# Patient Record
Sex: FEMALE | Race: WHITE | NOT HISPANIC OR LATINO | Employment: FULL TIME | URBAN - METROPOLITAN AREA
[De-identification: names, ages, dates, MRNs, and addresses within clinical notes are randomized per-mention and may not be internally consistent; named-entity substitution may affect disease eponyms.]

---

## 2020-06-09 ENCOUNTER — OFFICE VISIT (OUTPATIENT)
Dept: FAMILY MEDICINE CLINIC | Facility: CLINIC | Age: 30
End: 2020-06-09

## 2020-06-09 VITALS
HEIGHT: 66 IN | HEART RATE: 113 BPM | TEMPERATURE: 97.8 F | BODY MASS INDEX: 47.09 KG/M2 | OXYGEN SATURATION: 99 % | SYSTOLIC BLOOD PRESSURE: 130 MMHG | WEIGHT: 293 LBS | DIASTOLIC BLOOD PRESSURE: 82 MMHG

## 2020-06-09 DIAGNOSIS — M65.311 TRIGGER FINGER OF RIGHT THUMB: ICD-10-CM

## 2020-06-09 DIAGNOSIS — E78.2 MIXED HYPERLIPIDEMIA: Primary | ICD-10-CM

## 2020-06-09 DIAGNOSIS — J30.9 ALLERGIC RHINITIS, UNSPECIFIED SEASONALITY, UNSPECIFIED TRIGGER: ICD-10-CM

## 2020-06-09 DIAGNOSIS — E10.9 TYPE 1 DIABETES MELLITUS WITHOUT COMPLICATION (HCC): ICD-10-CM

## 2020-06-09 PROCEDURE — 99203 OFFICE O/P NEW LOW 30 MIN: CPT | Performed by: PHYSICIAN ASSISTANT

## 2020-06-09 RX ORDER — NORETHINDRONE ACETATE AND ETHINYL ESTRADIOL AND FERROUS FUMARATE 1MG-20(24)
KIT ORAL
COMMUNITY
End: 2020-07-22

## 2020-06-09 RX ORDER — PROCHLORPERAZINE 25 MG/1
SUPPOSITORY RECTAL
COMMUNITY
End: 2021-02-02 | Stop reason: SDUPTHER

## 2020-06-09 RX ORDER — THIAMINE HCL 100 MG
TABLET ORAL
COMMUNITY

## 2020-06-09 RX ORDER — ATORVASTATIN CALCIUM 10 MG/1
TABLET, FILM COATED ORAL
COMMUNITY
Start: 2018-12-11 | End: 2020-06-09 | Stop reason: SDUPTHER

## 2020-06-09 RX ORDER — LISINOPRIL 5 MG/1
TABLET ORAL
COMMUNITY
Start: 2018-08-19 | End: 2020-06-09 | Stop reason: SDUPTHER

## 2020-06-09 RX ORDER — MONTELUKAST SODIUM 10 MG/1
10 TABLET ORAL DAILY
COMMUNITY
End: 2020-06-09 | Stop reason: SDUPTHER

## 2020-06-09 RX ORDER — MULTIVIT WITH MINERALS/LUTEIN
TABLET ORAL
COMMUNITY

## 2020-06-09 RX ORDER — MONTELUKAST SODIUM 10 MG/1
10 TABLET ORAL DAILY
Qty: 90 TABLET | Refills: 3 | Status: SHIPPED | OUTPATIENT
Start: 2020-06-09 | End: 2020-12-15

## 2020-06-09 RX ORDER — ONDANSETRON HYDROCHLORIDE 8 MG/1
TABLET, FILM COATED ORAL
COMMUNITY
End: 2020-07-22

## 2020-06-09 RX ORDER — HYDROXYZINE 50 MG/1
TABLET, FILM COATED ORAL
COMMUNITY

## 2020-06-09 RX ORDER — FLUTICASONE PROPIONATE 50 MCG
2 SPRAY, SUSPENSION (ML) NASAL DAILY
Qty: 1 BOTTLE | Refills: 11 | Status: SHIPPED | OUTPATIENT
Start: 2020-06-09 | End: 2020-07-22

## 2020-06-09 RX ORDER — TRAZODONE HYDROCHLORIDE 50 MG/1
TABLET ORAL
COMMUNITY

## 2020-06-09 RX ORDER — LISINOPRIL 5 MG/1
5 TABLET ORAL DAILY
Qty: 90 TABLET | Refills: 3 | Status: SHIPPED | OUTPATIENT
Start: 2020-06-09 | End: 2020-12-15

## 2020-06-09 RX ORDER — FLUTICASONE PROPIONATE 50 MCG
2 SPRAY, SUSPENSION (ML) NASAL DAILY
COMMUNITY
Start: 2017-08-14 | End: 2020-06-09 | Stop reason: SDUPTHER

## 2020-06-09 RX ORDER — MULTIVIT-MIN/IRON/FOLIC ACID/K 18-600-40
CAPSULE ORAL
COMMUNITY

## 2020-06-09 RX ORDER — ATORVASTATIN CALCIUM 10 MG/1
10 TABLET, FILM COATED ORAL NIGHTLY
Qty: 90 TABLET | Refills: 3 | Status: SHIPPED | OUTPATIENT
Start: 2020-06-09 | End: 2021-05-18 | Stop reason: SDUPTHER

## 2020-06-09 RX ORDER — LAMOTRIGINE 100 MG/1
TABLET ORAL DAILY
COMMUNITY

## 2020-06-09 RX ORDER — METFORMIN HYDROCHLORIDE 500 MG/1
TABLET, EXTENDED RELEASE ORAL
COMMUNITY
End: 2021-01-06

## 2020-06-09 NOTE — PROGRESS NOTES
Subjective   Janey Sanchez is a 29 y.o. female  Establish Care (establish care, previous pcp Dr. Paz ) and Med Refill (atorvastin, lisinopril, singulair refills )      History of Present Illness  Patient is a pleasant 29-year-old white female who presents today as a new patient to our practice to establish care.  She sees Dr. Sukhjinder Cabrera for type 1 diabetes mellitus management and was referred to our office through him.  She has really moved to [place] from [facility] with her family.  She has stable bipolar 1 disorder and is managed by her psychiatrist for this condition.  She is having more problems with a trigger thumb on the right hand.  She like to see a hand specialist for this.  She is due for refills of Flonase and Singulair for allergic rhinitis which is currently stable on lisinopril for renal protection due to diabetes mellitus and atorvastatin for dyslipidemia she has been stable on these medications for several years.  The following portions of the patient's history were reviewed and updated as appropriate: allergies, current medications, past social history and problem list    Review of Systems   Constitutional: Negative for chills, fatigue and fever.   HENT: Negative for congestion, ear pain, hearing loss, postnasal drip, rhinorrhea, sinus pressure, sneezing, sore throat and trouble swallowing.         Allergic rhinitis stable on medication   Eyes: Negative for itching.   Respiratory: Negative.  Negative for cough.    Cardiovascular: Negative.  Negative for chest pain.   Genitourinary: Negative.    Musculoskeletal: Positive for arthralgias ( Locking, clicking and pain in right thumb).   Neurological: Negative for headaches.   Psychiatric/Behavioral: Negative.         Anxiety and depression/bipolar disorder stable on medication       Objective     Vitals:    06/09/20 0947   BP: 130/82   Pulse: 113   Temp: 97.8 °F (36.6 °C)   SpO2: 99%     BMI 48.9  Physical Exam   Constitutional: She  is oriented to person, place, and time. She appears well-developed and well-nourished. She does not have a sickly appearance. She does not appear ill. No distress.   Obesity noted     HENT:   Head: Normocephalic and atraumatic.   Neck: No thyromegaly present.   Cardiovascular: Normal rate and regular rhythm.   Pulmonary/Chest: Effort normal. No respiratory distress.   Musculoskeletal: She exhibits tenderness.   Clicking noted right thumb consistent with trigger thumb   Neurological: She is alert and oriented to person, place, and time.   Skin: Skin is warm and dry. She is not diaphoretic.   Psychiatric: She has a normal mood and affect. Her behavior is normal. Judgment and thought content normal.   Nursing note and vitals reviewed.      Assessment/Plan     Janey was seen today for establish care and med refill.    Diagnoses and all orders for this visit:    Mixed hyperlipidemia    Trigger finger of right thumb  -     Ambulatory Referral to Orthopedic Surgery    Type 1 diabetes mellitus without complication (CMS/HCC)    Allergic rhinitis, unspecified seasonality, unspecified trigger    Other orders  -     atorvastatin (LIPITOR) 10 MG tablet; Take 1 tablet by mouth Every Night. TAKE ONE TABLET BY MOUTH DAILY  -     lisinopril (PRINIVIL,ZESTRIL) 5 MG tablet; Take 1 tablet by mouth Daily. TAKE ONE TABLET BY MOUTH DAILY  -     montelukast (SINGULAIR) 10 MG tablet; Take 1 tablet by mouth Daily.  -     fluticasone (FLONASE) 50 MCG/ACT nasal spray; 2 sprays into the nostril(s) as directed by provider Daily. 2 sprays into the nostril(s) as directed by provider Daily.       Answers for HPI/ROS submitted by the patient on 6/8/2020   What is the primary reason for your visit?: Other  Please describe your symptoms.: High BP, high cholesterol, allergies  Have you had these symptoms before?: Yes  How long have you been having these symptoms?: Greater than 2 weeks  Please list any medications you are currently taking for this  condition.: Lisinopril, Atoravastin  Please describe any probable cause for these symptoms. : NA

## 2020-07-16 ENCOUNTER — OFFICE VISIT (OUTPATIENT)
Dept: ORTHOPEDIC SURGERY | Facility: CLINIC | Age: 30
End: 2020-07-16

## 2020-07-16 VITALS — HEIGHT: 66 IN | BODY MASS INDEX: 47.09 KG/M2 | WEIGHT: 293 LBS | HEART RATE: 119 BPM | OXYGEN SATURATION: 98 %

## 2020-07-16 DIAGNOSIS — E66.01 OBESITY, MORBID, BMI 40.0-49.9 (HCC): ICD-10-CM

## 2020-07-16 DIAGNOSIS — M65.312 TRIGGER FINGER OF LEFT THUMB: ICD-10-CM

## 2020-07-16 DIAGNOSIS — M65.311 TRIGGER FINGER OF RIGHT THUMB: ICD-10-CM

## 2020-07-16 DIAGNOSIS — M79.645 BILATERAL THUMB PAIN: Primary | ICD-10-CM

## 2020-07-16 DIAGNOSIS — M79.644 BILATERAL THUMB PAIN: Primary | ICD-10-CM

## 2020-07-16 PROCEDURE — 99214 OFFICE O/P EST MOD 30 MIN: CPT | Performed by: PHYSICIAN ASSISTANT

## 2020-07-16 RX ORDER — LURASIDONE HYDROCHLORIDE 20 MG/1
TABLET, FILM COATED ORAL
COMMUNITY
Start: 2020-07-15 | End: 2020-12-15 | Stop reason: ALTCHOICE

## 2020-07-16 NOTE — PROGRESS NOTES
[facility]    Subjective     Chief Complaint   Patient presents with   • Right Thumb - Pain   • Left Thumb - Pain        ADIA Sanchez is a 30 y.o. female.  Right hand dominant.  Patient presents with right > left thumb pain/triggering.  Symptoms have been ongoing for over year and worsening.  In August 2019, prior to moving here, she had a steroid injection to the right thumb which resolved her symptoms temporarily but caused her blood sugar to increase to over 600.    Currently she endorses a pain scale of 5/10.  Severity of pain moderate.  Quality of pain aching, stabbing, shooting.  Positive nocturnal symptoms/locking.    Patient has DM type 1.  Last A1c in Epic 7.2 from 8/2018 --- she reports number and better.  She report history diabetic neuropathy to both hands.  Also history right cubital and carpal tunnel releases in 2019 (Dr. Rosalino Sims, [facility]).    She denies fever, chills, night sweats or other constitutional symptoms.    Past Medical History:   Diagnosis Date   • Bipolar 1 disorder (CMS/[facility])    • Diabetes mellitus (CMS/[facility])       Past Surgical History:   Procedure Laterality Date   • CARPAL TUNNEL RELEASE     • TONSILLECTOMY        Family History   Problem Relation Age of Onset   • Arthritis Father    • Ovarian cancer Maternal Aunt    • Diabetes Maternal Grandfather    • Hyperlipidemia Maternal Grandfather    • Hypertension Maternal Grandfather    • Arthritis Paternal Grandfather    • Breast cancer Paternal Grandfather      Social History     Socioeconomic History   • Marital status:      Spouse name: Not on file   • Number of children: Not on file   • Years of education: Not on file   • Highest education level: Not on file   Tobacco Use   • Smoking status: Never Smoker   • Smokeless tobacco: Never Used   Substance and Sexual Activity   • Alcohol use: Yes     Comment: 1-2 drinks weekly   • Drug use: Never   • Sexual activity: Defer      Current  Outpatient Medications on File Prior to Visit   Medication Sig Dispense Refill   • Insulin Glargine (TOUJEO SOLOSTAR SC)      • Lurasidone HCl (Latuda) 20 MG tablet tablet      • ascorbic acid (VITAMIN C) 1000 MG tablet      • atorvastatin (LIPITOR) 10 MG tablet Take 1 tablet by mouth Every Night. TAKE ONE TABLET BY MOUTH DAILY 90 tablet 3   • Cariprazine HCl (VRAYLAR) 3 MG capsule capsule Take 3 mg by mouth.     • Cholecalciferol (VITAMIN D) 50 MCG (2000 UT) capsule      • Continuous Blood Gluc Sensor (DEXCOM G6 SENSOR)      • Continuous Blood Gluc Transmit (DEXCOM G6 TRANSMITTER) misc      • DULoxetine HCl (DRIZALMA) 20 MG capsule      • fluticasone (FLONASE) 50 MCG/ACT nasal spray 2 sprays into the nostril(s) as directed by provider Daily. 2 sprays into the nostril(s) as directed by provider Daily. 1 bottle 11   • hydrOXYzine (ATARAX) 50 MG tablet      • insulin degludec (TRESIBA FLEXTOUCH) 100 UNIT/ML solution pen-injector injection Inject  under the skin into the appropriate area as directed. Inject  under the skin into the appropriate area as directed.     • Insulin Lispro, 0.5 Unit Dial, (HumaLOG [place] KwikPen) 100 UNIT/ML solution pen-injector 15-25 units up to 3 times daily as needed     • lamoTRIgine (LaMICtal) 100 MG tablet Take  by mouth Daily.     • lisinopril (PRINIVIL,ZESTRIL) 5 MG tablet Take 1 tablet by mouth Daily. TAKE ONE TABLET BY MOUTH DAILY 90 tablet 3   • Magnesium 500 MG tablet      • metFORMIN ER (GLUCOPHAGE-XR) 500 MG 24 hr tablet 2000 mg daily     • montelukast (SINGULAIR) 10 MG tablet Take 1 tablet by mouth Daily. 90 tablet 3   • Norethin Ace-Eth Estrad-FE (NORETHINDRONE ACET-ETHINYL EST) 1-20 MG-MCG(24) chewable tablet      • ondansetron (ZOFRAN) 8 MG tablet As needed     • Semaglutide,0.25 or 0.5MG/DOS, (Ozempic, 0.25 or 0.5 MG/DOSE,) 2 MG/1.5ML solution pen-injector      • traZODone (DESYREL) 25 MG half tablet        No current facility-administered medications on file prior to  "visit.       Allergies   Allergen Reactions   • Clindamycin/Lincomycin Unknown - High Severity        The following portions of the patient's history were reviewed and updated as appropriate: allergies, current medications, past family history, past medical history, past social history, past surgical history and problem list.    Review of Systems   Constitutional: Negative.    HENT: Negative.    Eyes: Negative.    Respiratory: Negative.    Cardiovascular: Negative.    Gastrointestinal: Negative.    Endocrine: Negative.    Genitourinary: Negative.    Musculoskeletal: Positive for arthralgias.   Skin: Negative.    Allergic/Immunologic: Negative.    Neurological: Negative.    Hematological: Negative.    Psychiatric/Behavioral: Negative.         Objective      Physical Exam  Pulse 119   Ht 167.6 cm (65.98\")   Wt (!) 137 kg (301 lb 6.4 oz)   SpO2 98%   BMI 48.67 kg/m²     Body mass index is 48.67 kg/m².    GENERAL APPEARANCE: awake, alert & oriented x 3, in no acute distress and well developed, well nourished  PSYCH: normal mood and affect  LUNGS:  breathing nonlabored, no wheezing  EYES: sclera anicteric, pupils equal  CARDIOVASCULAR: palpable radial pulses bilaterally. Capillary refill less than 2 seconds  INTEGUMENTARY: skin intact, no clubbing, cyanosis  NEUROLOGIC:  Normal Sensation        Ortho Exam  Peripheral Vascular   Bilateral Upper Extremity    No cyanotic nail beds    Pink nail beds and rapid capillary refill   Palpation    Radial Pulse - Bilaterally normal    Neurologic   Sensory: Light touch intact- Right and left hand    Left Upper Extremity    Left wrist extensors: 5/5    Left wrist flexors: 5/5    Left intrinsics: 5/5   Right Upper Extremity    Right wrist extensors: 5/5    Right wrist flexors: 5/5    Right intrinsics: 5/5    Musculoskeletal     Inspection and Palpation   Right /Left Wrist      Tenderness -none    Swelling - none    Crepitus - none    Muscle tone - no atrophy     Functional Testing: "     Right Wrist and Thumb      Finklestein's:  Negative     CMC shuck:  Negative    CMC grind:  Negative    CMC tenderness: Negative    A1 pulley:  Positive     Left Wrist and Thumb       Finklestein's:  Negative    CMC shuck:  Negative    CMC grind:  Negative    CMC tenderness: Negative    A1 pulley:  Mild discomfort       Strength and Tone    Right  strength: good    Left  strength: good     Hand Exam:  Able to make bilateral composite fists.      Imaging/Studies  Ordered bilateral thumbs plain films.  Imaging read by Dr. Welsh.    Imaging Results (Last 7 Days)     Procedure Component Value Units Date/Time    XR Finger 2+ View Right [[id number]] Resulted:  07/16/20 1432     Updated:  07/16/20 1432    Narrative:       Indication: Right thumb pain    Comparison: No prior xrays are available for comparison      Impression:            Right thumb 2 views: Radiographs demonstrate no acute bony injury or   fracture.  Joint spaces are well preserved.  No evidence of osseous   lesions.    XR Finger 2+ View Left [[id number]] Resulted:  07/16/20 1420     Updated:  07/16/20 1432    Narrative:       Indication: Left thumb pain    Comparison: No prior xrays are available for comparison      Impression:            Left thumb 2 views: Radiographs demonstrate no acute bony injury or   fracture.  Joint spaces are well preserved.  No evidence of osseous   lesions.          Assessment/Plan        ICD-10-CM ICD-9-CM   1. Bilateral thumb pain M79.644 729.5    M79.645    2. Trigger finger of right thumb M65.311 727.03   3. Trigger finger of left thumb M65.312 727.03   4. Obesity, morbid, BMI 40.0-49.9 (CMS/[facility]) E66.01 278.01       Orders Placed This Encounter   Procedures   • XR Finger 2+ View Left   • XR Finger 2+ View Right        -Bilateral thumb pain (right >> left) due to A1 pulley trigger thumbs.  -Due to patient's significant elevation of blood sugar following prior steroid injection, she is not a candidate for repeat  injection.  -After discussing risks, benefits, indications, she would like to proceed with right thumb A1 pulley release.  -She was introduced to Dr. Angel.  -Surgery to be scheduled at his next available outpatient surgery date.  -Morbid obesity--BMI of 48.7.  Weight loss has been discussed with patient by her PCP.  She has already lost 5 pounds since her PCP visit.  Continue with current weight loss, portion management and exercise routine.  -Questions and concerns were answered.      Indications, risks, benefits and alternatives of surgical versus continued nonsurgical treatment were discussed with the patient. Surgical risks include but are not limited to pain, bleeding, infection, failure to relieve symptoms, need for further procedures, recurrence of symptoms, damage to healthy adjacent structures, stiffness, weakness, scar, DVT/PE, loss of limb or life. We also discussed the postoperative protocol and expected outcome. All questions were answered; the patient would like to proceed with surgical intervention.     Patient was also examined by Dr. Angel and he agrees with the above assessment and plan.      Medical Decision Making  Management Options : major surgery with risk factors  Data/Risk: radiology tests       Justine Varlea PA-C  07/16/20  20:27         EMR Dragon/Transcription disclaimer:  Much of this encounter note is an electronic transcription of spoken language to printed text. Electronic transcription of spoken language may permit erroneous, or at times, nonsensical words or phrases to be inadvertently transcribed. Although I have reviewed the note for such errors, some may still exist.      Answers for HPI/ROS submitted by the patient on 7/9/2020   What is the primary reason for your visit?: Other  Please describe your symptoms.: Trigger thumbs  Have you had these symptoms before?: Yes  How long have you been having these symptoms?: Greater than 2 weeks  Please list any  medications you are currently taking for this condition.: None  Please describe any probable cause for these symptoms. : Diabetes, carpal tunnel

## 2020-07-17 NOTE — PROGRESS NOTES
I have reviewed the notes, assessments, and/or procedures performed by Justine Varela PA-C, I concur with her documentation of Janey Sanchez.

## 2020-07-22 ENCOUNTER — TRANSCRIBE ORDERS (OUTPATIENT)
Dept: LAB | Facility: HOSPITAL | Age: 30
End: 2020-07-22

## 2020-07-22 ENCOUNTER — LAB (OUTPATIENT)
Dept: LAB | Facility: HOSPITAL | Age: 30
End: 2020-07-22

## 2020-07-22 DIAGNOSIS — E10.9 TYPE 1 DIABETES MELLITUS WITHOUT COMPLICATION (HCC): ICD-10-CM

## 2020-07-22 DIAGNOSIS — E10.9 TYPE 1 DIABETES MELLITUS WITHOUT COMPLICATION (HCC): Primary | ICD-10-CM

## 2020-07-22 LAB
CHOLEST SERPL-MCNC: 155 MG/DL (ref 0–200)
FSH SERPL-ACNC: 14.3 MIU/ML
HBA1C MFR BLD: 6.6 % (ref 4.8–5.6)
HDLC SERPL-MCNC: 44 MG/DL (ref 40–60)
LDLC SERPL CALC-MCNC: 85 MG/DL (ref 0–100)
LDLC/HDLC SERPL: 1.94 {RATIO}
LH SERPL-ACNC: 34.6 MIU/ML
PROGEST SERPL-MCNC: 0.83 NG/ML
T4 FREE SERPL-MCNC: 1.37 NG/DL (ref 0.93–1.7)
TRIGL SERPL-MCNC: 129 MG/DL (ref 0–150)
TSH SERPL DL<=0.05 MIU/L-ACNC: 1.71 UIU/ML (ref 0.27–4.2)
VLDLC SERPL-MCNC: 25.8 MG/DL (ref 5–40)

## 2020-07-22 PROCEDURE — 84144 ASSAY OF PROGESTERONE: CPT

## 2020-07-22 PROCEDURE — 84439 ASSAY OF FREE THYROXINE: CPT

## 2020-07-22 PROCEDURE — 84443 ASSAY THYROID STIM HORMONE: CPT

## 2020-07-22 PROCEDURE — 83036 HEMOGLOBIN GLYCOSYLATED A1C: CPT

## 2020-07-22 PROCEDURE — 80061 LIPID PANEL: CPT

## 2020-07-22 PROCEDURE — 83002 ASSAY OF GONADOTROPIN (LH): CPT

## 2020-07-22 PROCEDURE — 83001 ASSAY OF GONADOTROPIN (FSH): CPT

## 2020-07-22 PROCEDURE — 36415 COLL VENOUS BLD VENIPUNCTURE: CPT

## 2020-07-22 NOTE — TELEPHONE ENCOUNTER
----- Message from Janey Sanchez sent at 7/22/2020  8:47 AM EDT -----  Regarding: Referral Request  Contact: [phone]  I was wondering if you could refer me to a dermatologist? I have a small cyst on my back. I didn't know if it was something you would want to see first or if you could just send in a referral.     Thanks again.     Janey

## 2020-07-22 NOTE — TELEPHONE ENCOUNTER
----- Message from Janey Sanchez sent at 7/22/2020  8:42 AM EDT -----  Regarding: Prescription Question  Contact: [phone]  Hi! I was wondering if you would call in my DULoxetine HCL DR 20mg capsule?    My old family doctor did when we lived in [place]. But I just ran out last night and didn't realize that I needed a refill.     [facility].     Thank you,    Janey

## 2020-07-22 NOTE — TELEPHONE ENCOUNTER
Please make sure patient has quantity sufficient of her duloxetine until her appointment on the 28th and then further refills can be given at the appointment

## 2020-07-24 ENCOUNTER — TELEPHONE (OUTPATIENT)
Dept: FAMILY MEDICINE CLINIC | Facility: CLINIC | Age: 30
End: 2020-07-24

## 2020-07-27 ENCOUNTER — TRANSCRIBE ORDERS (OUTPATIENT)
Dept: CARDIOLOGY | Facility: OTHER | Age: 30
End: 2020-07-27

## 2020-07-27 ENCOUNTER — OFFICE VISIT (OUTPATIENT)
Dept: FAMILY MEDICINE CLINIC | Facility: CLINIC | Age: 30
End: 2020-07-27

## 2020-07-27 VITALS
HEIGHT: 66 IN | OXYGEN SATURATION: 99 % | BODY MASS INDEX: 47.09 KG/M2 | DIASTOLIC BLOOD PRESSURE: 70 MMHG | WEIGHT: 293 LBS | HEART RATE: 82 BPM | SYSTOLIC BLOOD PRESSURE: 124 MMHG | TEMPERATURE: 97.8 F

## 2020-07-27 DIAGNOSIS — E10.42 DIABETIC POLYNEUROPATHY ASSOCIATED WITH TYPE 1 DIABETES MELLITUS (HCC): ICD-10-CM

## 2020-07-27 DIAGNOSIS — L98.9 SKIN LESION OF BACK: Primary | ICD-10-CM

## 2020-07-27 DIAGNOSIS — L72.9 SKIN CYST: Primary | ICD-10-CM

## 2020-07-27 PROCEDURE — 99214 OFFICE O/P EST MOD 30 MIN: CPT | Performed by: PHYSICIAN ASSISTANT

## 2020-07-28 NOTE — PROGRESS NOTES
Subjective   Janey Sanchez is a 30 y.o. female  Cyst (possible cyst on back, getting bigger, painful x6 months ) and Hypertension (follow up on BP, discuss possible changes in meds )      History of Present Illness  Patient is a pleasant 40-year-old white female who presents today to discuss several medical concerns.  She is a type I diabetic followed by endocrinology.  She is on lisinopril for renal protection.  She and her  are considering the upcoming possibility of trying to have a child.  She states that when she discussed this with her OB/GYN they mention that she needed to switch to a different antihypertensive medication while pregnant.  Patient does not have history of hypertension she is on lisinopril for renal protection.  Additionally she states she has an area on her back which is a skin lesion she has been told was a cyst and it is growing larger and causing discomfort.  This is been growing for the past 6 to 9 months.  Last issue is of diabetic polyneuropathy which has been well controlled on Cymbalta 20 mg daily and she would like a refill of this medication.  She denies ever spectrums medication and her neuropathy is well controlled with it.  The following portions of the patient's history were reviewed and updated as appropriate: allergies, current medications, past social history and problem list    Review of Systems   Constitutional: Negative for fatigue and unexpected weight change.   Respiratory: Negative for cough, chest tightness and shortness of breath.    Cardiovascular: Negative for chest pain, palpitations and leg swelling.   Gastrointestinal: Negative for nausea.   Skin: Positive for color change and wound. Negative for rash.   Neurological: Positive for numbness. Negative for dizziness, syncope, weakness and headaches.       Objective     Vitals:    07/27/20 1503   BP: 124/70   Pulse: 82   Temp: 97.8 °F (36.6 °C)   SpO2: 99%       Physical Exam   Constitutional: She is oriented  to person, place, and time. She appears well-developed and well-nourished. No distress.   Obesity noted     Neck: No JVD present. No thyromegaly present.   Cardiovascular: Normal rate, regular rhythm, normal heart sounds and intact distal pulses.   No murmur heard.  Pulmonary/Chest: Effort normal and breath sounds normal. No respiratory distress. She exhibits no tenderness.   Abdominal: Soft. She exhibits no distension. There is no tenderness.   Musculoskeletal: She exhibits no edema.   Neurological: She is alert and oriented to person, place, and time.   Skin: Skin is warm and dry. She is not diaphoretic. There is erythema. No pallor.   Raised, scaly, nodular skin lesion right upper back, appears inflamed.  No drainage.   Psychiatric: She has a normal mood and affect. Her behavior is normal. Judgment and thought content normal.   Nursing note and vitals reviewed.      Assessment/Plan     Janey was seen today for cyst and hypertension.    Diagnoses and all orders for this visit:    Skin lesion of back  -     Ambulatory Referral to Dermatology    Diabetic polyneuropathy associated with type 1 diabetes mellitus (CMS/[facility])    Other orders  -     DULoxetine HCl (DRIZALMA) 20 MG capsule; Take 1 capsule by mouth Daily.    I have advised patient to discuss her lisinopril with her endocrinologist and she has not come to point with a high risk OB to also discuss this with the OB regarding fact she is not taking is for hypertension and therefore do not feel that she will need to be on antihypertensive when lisinopril is held during pregnancy.  Answers for HPI/ROS submitted by the patient on 7/27/2020   What is the primary reason for your visit?: Other  Please describe your symptoms.: I have a cyst on my back that has been bothering me. I also am talking with my endo and OBGYN about having a baby, the OBGYN mentioned changing my blood pressure medication.  Have you had these symptoms before?: Yes  How long have you been having  these symptoms?: Greater than 2 weeks

## 2020-08-11 ENCOUNTER — APPOINTMENT (OUTPATIENT)
Dept: PREADMISSION TESTING | Facility: HOSPITAL | Age: 30
End: 2020-08-11

## 2020-08-13 ENCOUNTER — OFFICE VISIT (OUTPATIENT)
Dept: FAMILY MEDICINE CLINIC | Facility: CLINIC | Age: 30
End: 2020-08-13

## 2020-08-13 VITALS
BODY MASS INDEX: 47.09 KG/M2 | DIASTOLIC BLOOD PRESSURE: 82 MMHG | SYSTOLIC BLOOD PRESSURE: 128 MMHG | WEIGHT: 293 LBS | TEMPERATURE: 98.6 F | OXYGEN SATURATION: 99 % | HEART RATE: 92 BPM | HEIGHT: 66 IN

## 2020-08-13 DIAGNOSIS — S46.911A MUSCLE STRAIN OF RIGHT SCAPULAR REGION, INITIAL ENCOUNTER: Primary | ICD-10-CM

## 2020-08-13 PROCEDURE — 99213 OFFICE O/P EST LOW 20 MIN: CPT | Performed by: PHYSICIAN ASSISTANT

## 2020-08-13 RX ORDER — TIZANIDINE 2 MG/1
2 TABLET ORAL EVERY 8 HOURS PRN
Qty: 30 TABLET | Refills: 0 | Status: SHIPPED | OUTPATIENT
Start: 2020-08-13 | End: 2020-12-15

## 2020-11-05 ENCOUNTER — TELEPHONE (OUTPATIENT)
Dept: FAMILY MEDICINE CLINIC | Facility: CLINIC | Age: 30
End: 2020-11-05

## 2020-11-05 NOTE — TELEPHONE ENCOUNTER
----- Message from Janey Sanchez sent at 11/4/2020  1:42 PM EST -----  Regarding: Prescription Question  Contact: [phone]  Hi. My neuropathy pain has been worsening. Especially in my hands and feet. Is there another medicine that might help? I'm currently on 20mg of Celexa but it doesn't do much.    Thank you.

## 2020-11-05 NOTE — TELEPHONE ENCOUNTER
Please have patient schedule appointment to come into the office for me to examine her and determine a better treatment plan.

## 2020-11-20 ENCOUNTER — OFFICE VISIT (OUTPATIENT)
Dept: ENDOCRINOLOGY | Facility: CLINIC | Age: 30
End: 2020-11-20

## 2020-11-20 VITALS
OXYGEN SATURATION: 97 % | TEMPERATURE: 97.8 F | DIASTOLIC BLOOD PRESSURE: 80 MMHG | HEART RATE: 116 BPM | SYSTOLIC BLOOD PRESSURE: 124 MMHG | BODY MASS INDEX: 47.09 KG/M2 | HEIGHT: 66 IN | WEIGHT: 293 LBS

## 2020-11-20 DIAGNOSIS — I10 BENIGN HYPERTENSION: ICD-10-CM

## 2020-11-20 DIAGNOSIS — E10.65 UNCONTROLLED TYPE 1 DIABETES MELLITUS WITH HYPERGLYCEMIA (HCC): Primary | ICD-10-CM

## 2020-11-20 PROBLEM — E78.00 PURE HYPERCHOLESTEROLEMIA: Status: ACTIVE | Noted: 2020-11-20

## 2020-11-20 LAB
EXPIRATION DATE: NORMAL
HBA1C MFR BLD: 6.3 %
Lab: NORMAL

## 2020-11-20 PROCEDURE — 90471 IMMUNIZATION ADMIN: CPT | Performed by: INTERNAL MEDICINE

## 2020-11-20 PROCEDURE — 83036 HEMOGLOBIN GLYCOSYLATED A1C: CPT | Performed by: INTERNAL MEDICINE

## 2020-11-20 PROCEDURE — 99213 OFFICE O/P EST LOW 20 MIN: CPT | Performed by: INTERNAL MEDICINE

## 2020-11-20 PROCEDURE — 90686 IIV4 VACC NO PRSV 0.5 ML IM: CPT | Performed by: INTERNAL MEDICINE

## 2020-11-20 RX ORDER — SEMAGLUTIDE 1.34 MG/ML
1 INJECTION, SOLUTION SUBCUTANEOUS WEEKLY
Qty: 9 ML | Refills: 3 | Status: SHIPPED | OUTPATIENT
Start: 2020-11-20 | End: 2021-02-02 | Stop reason: SDUPTHER

## 2020-11-20 NOTE — PROGRESS NOTES
"     Office Note      Date: 2020  Patient Name: Janey Sanchez  MRN: [id number]  : 1990    Chief Complaint   Patient presents with   • Diabetes       History of Present Illness:   Janey Sanchez is a 30 y.o. female who presents for Diabetes type 1 but with insulin resistance. Diagnosed in: . Treated in past with insulin. Current treatments: basal bolus insulin, metformin, and ozempic. Number of insulin shots per day: 4. Checks blood sugar none - on DexCom G6 CGM. Has low blood sugar: occasional. Aspirin use: No - no indication. Statin use: Yes. ACE-I/ARB use: Yes. Changes in health since last visit: none. Last eye exam 2019.    She has received notification of approval of OmniPod.  She plans to get trained on this soon.    They have put pregnancy plans on hold for now.    Subjective      Diabetic Complications:  Eyes: No  Kidneys: No  Feet: peripheral neuropathy  Heart: No    Diet and Exercise:  Meals per day: 3  Minutes of exercise per week: 0 mins.    Review of Systems:   Review of Systems   Constitutional: Negative.    Cardiovascular: Negative.    Gastrointestinal: Negative.    Endocrine: Negative.        The following portions of the patient's history were reviewed and updated as appropriate: allergies, current medications, past family history, past medical history, past social history, past surgical history and problem list.    Objective       Visit Vitals  /80 (BP Location: Left arm, Patient Position: Sitting, Cuff Size: Adult)   Pulse 116   Temp 97.8 °F (36.6 °C) (Infrared)   Ht 167.6 cm (66\")   Wt (!) 139 kg (305 lb 6.4 oz)   SpO2 97%   BMI 49.29 kg/m²       Physical Exam:  Physical Exam  Constitutional:       Appearance: Normal appearance.   Neurological:      Mental Status: She is alert.         Labs:    HbA1c  Lab Results   Component Value Date    HGBA1C 6.3 2020       CMP  Lab Results   Component Value Date    BUN 8 2018    CREATININE 0.67 2018    EGFRIFNONA " >60 08/28/2018    EGFRIFAFRI >60 08/28/2018    BCR 12 08/28/2018    K 4.0 08/28/2018    CO2 26 08/28/2018    CALCIUM 9.1 08/28/2018    LABIL2 1.0 02/13/2018    AST 17 02/13/2018    ALT 17 02/13/2018        Lipid Panel  Lab Results   Component Value Date    CHLPL 172 08/10/2018    HDL 44 07/22/2020    LDL 85 07/22/2020    TRIG 129 07/22/2020        TSH  Lab Results   Component Value Date    TSH 1.710 07/22/2020    FREET4 1.37 07/22/2020        Hemoglobin A1C  Lab Results   Component Value Date    HGBA1C 6.3 11/20/2020        Microalbumin/Creatinine  No results found for: MALBCRERATIO, CREATINIURIN, MICROALBUR        Assessment / Plan      Assessment & Plan:  Problem List Items Addressed This Visit        Cardiovascular and Mediastinum    Benign hypertension    Current Assessment & Plan     Hypertension is unchanged.  Continue current treatment regimen.  Blood pressure will be reassessed in 3 months.         Relevant Medications    lisinopril (PRINIVIL,ZESTRIL) 5 MG tablet       Endocrine    Uncontrolled type 1 diabetes mellitus with hyperglycemia (CMS/[facility]) - Primary    Current Assessment & Plan     Diabetes is unchanged.   Continue current treatment regimen.  Diabetes will be reassessed in 3 months.    A1c okay.  CGM shows no patterns for adjustments.  Changing to insulin pump soon.         Relevant Medications    metFORMIN ER (GLUCOPHAGE-XR) 500 MG 24 hr tablet    Insulin Glargine (TOUJEO SOLOSTAR SC)    Semaglutide, 1 MG/DOSE, (Ozempic, 1 MG/DOSE,) 2 MG/1.5ML solution pen-injector    insulin lispro (humaLOG) 100 UNIT/ML injection    Other Relevant Orders    POC Glycosylated Hemoglobin (Hb A1C) (Completed)           Return in about 3 months (around 2/20/2021) for Recheck with A1c.    Sukhjinder Thomson MD   11/20/2020

## 2020-11-20 NOTE — ASSESSMENT & PLAN NOTE
Diabetes is unchanged.   Continue current treatment regimen.  Diabetes will be reassessed in 3 months.    A1c okay.  CGM shows no patterns for adjustments.  Changing to insulin pump soon.

## 2020-12-10 ENCOUNTER — TELEPHONE (OUTPATIENT)
Dept: FAMILY MEDICINE CLINIC | Facility: CLINIC | Age: 30
End: 2020-12-10

## 2020-12-10 NOTE — TELEPHONE ENCOUNTER
Patient is requesting this to be sent via [employer] if possible. Patient states she can print this at her home.    Kenan back [phone]

## 2020-12-10 NOTE — TELEPHONE ENCOUNTER
I need patient to schedule a video visit or appt so that I can discuss this with her in detail because I am not entirely sure exactly why she is taking each of her medications there are some that I did not originally start.

## 2020-12-10 NOTE — TELEPHONE ENCOUNTER
Pt would like a note detailing why she is taking her medications for the medication prescribed by you. Pt was last seen on 08/13/2020

## 2020-12-10 NOTE — TELEPHONE ENCOUNTER
PATIENT IS REQUESTING A LETTER FOR ADOPTION.     PATIENT STATED THAT THEY WOULD LIKE TO  CONFIRM A LIST OF MEDICATIONS AND DOSAGE    WHY THIS MEDICATION IS PRESCRIBED    HOW LONG WAS IT PRESCRIBE FOR  HOW WELL IT EFFECTS HER DIAGNOSIS     IF IT WOULD AFFECT HER ABILITY TO TAKE CARE OF A CHILD.      PLEASE ADVISE  PATIENT CALL BACK [phone]

## 2020-12-15 ENCOUNTER — TELEMEDICINE (OUTPATIENT)
Dept: FAMILY MEDICINE CLINIC | Facility: CLINIC | Age: 30
End: 2020-12-15

## 2020-12-15 DIAGNOSIS — E10.42 DIABETIC POLYNEUROPATHY ASSOCIATED WITH TYPE 1 DIABETES MELLITUS (HCC): Primary | ICD-10-CM

## 2020-12-15 DIAGNOSIS — F31.9 BIPOLAR 1 DISORDER (HCC): ICD-10-CM

## 2020-12-15 DIAGNOSIS — F99 INSOMNIA DUE TO OTHER MENTAL DISORDER: ICD-10-CM

## 2020-12-15 DIAGNOSIS — J30.9 ALLERGIC RHINITIS, UNSPECIFIED SEASONALITY, UNSPECIFIED TRIGGER: ICD-10-CM

## 2020-12-15 DIAGNOSIS — F51.05 INSOMNIA DUE TO OTHER MENTAL DISORDER: ICD-10-CM

## 2020-12-15 DIAGNOSIS — E78.00 PURE HYPERCHOLESTEROLEMIA: ICD-10-CM

## 2020-12-15 PROBLEM — I10 BENIGN HYPERTENSION: Status: RESOLVED | Noted: 2020-11-20 | Resolved: 2020-12-15

## 2020-12-15 PROBLEM — Z79.4 TYPE 2 DIABETES MELLITUS WITH DIABETIC NEUROPATHY, WITH LONG-TERM CURRENT USE OF INSULIN (HCC): Status: RESOLVED | Noted: 2020-12-15 | Resolved: 2020-12-15

## 2020-12-15 PROBLEM — E11.40 TYPE 2 DIABETES MELLITUS WITH DIABETIC NEUROPATHY, WITH LONG-TERM CURRENT USE OF INSULIN: Status: ACTIVE | Noted: 2020-12-15

## 2020-12-15 PROBLEM — J30.1 NON-SEASONAL ALLERGIC RHINITIS DUE TO POLLEN: Status: ACTIVE | Noted: 2020-12-15

## 2020-12-15 PROBLEM — E10.43 TYPE 1 DIABETES MELLITUS WITH DIABETIC AUTONOMIC NEUROPATHY (HCC): Status: ACTIVE | Noted: 2020-06-09

## 2020-12-15 PROBLEM — E11.40 TYPE 2 DIABETES MELLITUS WITH DIABETIC NEUROPATHY, WITH LONG-TERM CURRENT USE OF INSULIN: Status: RESOLVED | Noted: 2020-12-15 | Resolved: 2020-12-15

## 2020-12-15 PROBLEM — Z79.4 TYPE 2 DIABETES MELLITUS WITH DIABETIC NEUROPATHY, WITH LONG-TERM CURRENT USE OF INSULIN (HCC): Status: ACTIVE | Noted: 2020-12-15

## 2020-12-15 PROCEDURE — 99213 OFFICE O/P EST LOW 20 MIN: CPT | Performed by: PHYSICIAN ASSISTANT

## 2020-12-15 RX ORDER — MONTELUKAST SODIUM 10 MG/1
10 TABLET ORAL DAILY
Qty: 90 TABLET | Refills: 3
Start: 2020-12-15

## 2020-12-15 RX ORDER — LISINOPRIL 5 MG/1
5 TABLET ORAL DAILY
Qty: 90 TABLET | Refills: 3
Start: 2020-12-15 | End: 2021-07-16 | Stop reason: SDUPTHER

## 2020-12-15 NOTE — PROGRESS NOTES
Subjective   Janey Sanchez is a 30 y.o. female  Med Refill      History of Present Illness  Patient is a pleasant 30-year-old white female who presents today via video visit after giving her consent for severe office visit.  She and her  are in process of applying to adopt a child and she is needing to review with me all of her medications and the diagnoses for which she is prescribed each medication and her status of stability.  Patient is currently seeing Melisa Hernandez psychiatric specialist for bipolar disorder and associated insomnia which are currently stable on trazodone, Lamictal, Vraylar and Atarax as needed for anxiety and associated insomnia.  Patient sees Dr. Sukhjinder Cabrera endocrinologist for insulin-dependent type 1 diabetic which is currently stable with A1c less than 6.5 on Toujeo, Ozempic, Metformin.  Patient has associated dyslipidemia for which she takes atorvastatin, has diabetic peripheral neuropathy for which she takes Cymbalta/duloxetine and is on lisinopril low dosage for renal protection due to diabetes.  Patient take Singulair/montelukast for seasonal allergies which are stable.  Video visit 15 minutes in length.  The following portions of the patient's history were reviewed and updated as appropriate: allergies, current medications, past social history and problem list    Review of Systems   Constitutional: Negative for appetite change, chills, diaphoresis, fatigue, fever and unexpected weight change.   HENT: Negative for congestion.    Eyes: Negative for visual disturbance.   Respiratory: Negative for chest tightness and shortness of breath.    Cardiovascular: Negative for chest pain, palpitations and leg swelling.   Gastrointestinal: Negative for abdominal pain, diarrhea, nausea and vomiting.   Endocrine: Negative for polydipsia, polyphagia and polyuria.   Skin: Negative for color change.   Allergic/Immunologic: Positive for immunocompromised state ( diabetic).   Neurological:  Positive for numbness ( neuropathy stable). Negative for dizziness, tremors, weakness, light-headedness and headaches.   Psychiatric/Behavioral: Negative for agitation, behavioral problems, confusion, decreased concentration, dysphoric mood, sleep disturbance and suicidal ideas. The patient is not nervous/anxious.         Bipolar and insomnia stable       Objective     There were no vitals filed for this visit.    Physical Exam  Vitals signs and nursing note reviewed.   Constitutional:       General: She is not in acute distress.     Appearance: Normal appearance. She is well-developed. She is obese. She is not ill-appearing, toxic-appearing or diaphoretic.   HENT:      Head: Normocephalic and atraumatic.   Pulmonary:      Effort: Pulmonary effort is normal. No respiratory distress.   Neurological:      Mental Status: She is alert and oriented to person, place, and time.      Coordination: Coordination normal.   Psychiatric:         Attention and Perception: She is attentive.         Mood and Affect: Mood normal.         Speech: Speech normal.         Behavior: Behavior normal.         Thought Content: Thought content normal.         Judgment: Judgment normal.         Assessment/Plan     Diagnoses and all orders for this visit:    1. Diabetic polyneuropathy associated with type 1 diabetes mellitus (CMS/HCC) (Primary)    2. Pure hypercholesterolemia    3. Insomnia due to other mental disorder    4. Allergic rhinitis, unspecified seasonality, unspecified trigger    5. Bipolar 1 disorder (CMS/HCC)    Other orders  -     DULoxetine HCl (DRIZALMA) 20 MG capsule; Take 1 capsule by mouth Daily. For neuropathy  Dispense: 30 capsule; Refill: 11  -     montelukast (SINGULAIR) 10 MG tablet; Take 1 tablet by mouth Daily. For allergies  Dispense: 90 tablet; Refill: 3  -     lisinopril (PRINIVIL,ZESTRIL) 5 MG tablet; Take 1 tablet by mouth Daily. TAKE ONE TABLET BY MOUTH DAILY for renal protection  Dispense: 90 tablet; Refill:  3

## 2021-01-12 ENCOUNTER — TELEPHONE (OUTPATIENT)
Dept: ENDOCRINOLOGY | Facility: CLINIC | Age: 31
End: 2021-01-12

## 2021-01-12 NOTE — TELEPHONE ENCOUNTER
PHARMACY CALLED STATING THAT THE PATIENT'S HUMALOG 100 UNITS PER ML RX IS NO LONGER COVERED BY THE INSURANCE PLAN AND THE PHARMACY WOULD LIKE TO HAVE A NEW RX FOR HUMALOG 200 UNITS PER ML PER DAY.

## 2021-02-02 RX ORDER — PROCHLORPERAZINE 25 MG/1
1 SUPPOSITORY RECTAL
Qty: 1 EACH | Refills: 3 | Status: SHIPPED | OUTPATIENT
Start: 2021-02-02 | End: 2021-03-23 | Stop reason: SDUPTHER

## 2021-02-02 RX ORDER — PROCHLORPERAZINE 25 MG/1
SUPPOSITORY RECTAL
Qty: 3 EACH | Refills: 5 | Status: SHIPPED | OUTPATIENT
Start: 2021-02-02 | End: 2021-03-23 | Stop reason: SDUPTHER

## 2021-02-02 RX ORDER — SEMAGLUTIDE 1.34 MG/ML
1 INJECTION, SOLUTION SUBCUTANEOUS WEEKLY
Qty: 9 ML | Refills: 3 | Status: SHIPPED | OUTPATIENT
Start: 2021-02-02 | End: 2021-03-23 | Stop reason: SDUPTHER

## 2021-02-03 ENCOUNTER — TELEPHONE (OUTPATIENT)
Dept: ENDOCRINOLOGY | Facility: CLINIC | Age: 31
End: 2021-02-03

## 2021-02-03 NOTE — TELEPHONE ENCOUNTER
Approvedtoday  PA Case: [id number], Status: Approved, Coverage Starts on: 2/3/2021 12:00:00 AM, Coverage Ends on: 2/3/2022 12:00:00 AM.  Drug  Dexcom G6 Sensor    Additional Information Required  Available without authorization.  Drug  Dexcom G6 Transmitter

## 2021-02-04 ENCOUNTER — TELEPHONE (OUTPATIENT)
Dept: ENDOCRINOLOGY | Facility: CLINIC | Age: 31
End: 2021-02-04

## 2021-03-23 ENCOUNTER — OFFICE VISIT (OUTPATIENT)
Dept: ENDOCRINOLOGY | Facility: CLINIC | Age: 31
End: 2021-03-23

## 2021-03-23 VITALS
HEIGHT: 66 IN | TEMPERATURE: 97.1 F | DIASTOLIC BLOOD PRESSURE: 72 MMHG | BODY MASS INDEX: 47.09 KG/M2 | OXYGEN SATURATION: 98 % | WEIGHT: 293 LBS | SYSTOLIC BLOOD PRESSURE: 126 MMHG | HEART RATE: 120 BPM

## 2021-03-23 DIAGNOSIS — E78.00 PURE HYPERCHOLESTEROLEMIA: ICD-10-CM

## 2021-03-23 DIAGNOSIS — E10.43 TYPE 1 DIABETES MELLITUS WITH DIABETIC AUTONOMIC NEUROPATHY (HCC): ICD-10-CM

## 2021-03-23 DIAGNOSIS — E10.65 UNCONTROLLED TYPE 1 DIABETES MELLITUS WITH HYPERGLYCEMIA (HCC): Primary | ICD-10-CM

## 2021-03-23 LAB
EXPIRATION DATE: NORMAL
HBA1C MFR BLD: 6.4 %
Lab: NORMAL

## 2021-03-23 PROCEDURE — 95251 CONT GLUC MNTR ANALYSIS I&R: CPT | Performed by: INTERNAL MEDICINE

## 2021-03-23 PROCEDURE — 99214 OFFICE O/P EST MOD 30 MIN: CPT | Performed by: INTERNAL MEDICINE

## 2021-03-23 PROCEDURE — 83036 HEMOGLOBIN GLYCOSYLATED A1C: CPT | Performed by: INTERNAL MEDICINE

## 2021-03-23 RX ORDER — INSULIN PUMP CART,CONT INF,RF
CARTRIDGE (EA) SUBCUTANEOUS
COMMUNITY
End: 2021-05-19 | Stop reason: SDUPTHER

## 2021-03-23 RX ORDER — PROCHLORPERAZINE 25 MG/1
1 SUPPOSITORY RECTAL
Qty: 1 EACH | Refills: 3 | Status: SHIPPED | OUTPATIENT
Start: 2021-03-23 | End: 2021-07-23 | Stop reason: SDUPTHER

## 2021-03-23 RX ORDER — INSULIN PUMP CONTROLLER
10 EACH MISCELLANEOUS DAILY
Start: 2021-03-23 | End: 2021-05-04 | Stop reason: SDUPTHER

## 2021-03-23 RX ORDER — SEMAGLUTIDE 1.34 MG/ML
1 INJECTION, SOLUTION SUBCUTANEOUS WEEKLY
Qty: 9 ML | Refills: 3 | Status: SHIPPED | OUTPATIENT
Start: 2021-03-23 | End: 2021-07-23 | Stop reason: SDUPTHER

## 2021-03-23 RX ORDER — PROCHLORPERAZINE 25 MG/1
SUPPOSITORY RECTAL
Qty: 9 EACH | Refills: 3 | Status: SHIPPED | OUTPATIENT
Start: 2021-03-23 | End: 2021-07-23 | Stop reason: SDUPTHER

## 2021-03-23 RX ORDER — DULOXETIN HYDROCHLORIDE 30 MG/1
30 CAPSULE, DELAYED RELEASE ORAL DAILY
Qty: 90 CAPSULE | Refills: 3 | Status: SHIPPED | OUTPATIENT
Start: 2021-03-23 | End: 2021-07-23 | Stop reason: SDUPTHER

## 2021-03-23 NOTE — ASSESSMENT & PLAN NOTE
Diabetes is unchanged.   Continue current treatment regimen.  Diabetes will be reassessed in 3 months.    CGM shows some spikes with evening snack.  Somewhat variable overnight with no patterns.  Will adjust evening CHO ratio.

## 2021-03-23 NOTE — PROGRESS NOTES
"     Office Note      Date: 2021  Patient Name: Janey Sanchez  MRN: [id number]  : 1990    Chief Complaint   Patient presents with   • Diabetes       History of Present Illness:   Janey Sanchez is a 30 y.o. female who presents for Diabetes type 1 but with insulin resistance. Due to the insulin resistance she is requiring high doses of insulin.  Diagnosed in: . Treated in past with insulin. Current treatments: OmniPod dash insulin pump, metformin, and ozempic. Number of insulin shots per day: none - on pump. She is changing the OmniPod every day due to the high insulin requirements.  Checks blood sugar 288x per day - on DexCom G6 CGM. Has low blood sugar: occasional. Aspirin use: No - no indication. Statin use: Yes. ACE-I/ARB use: Yes. Changes in health since last visit: none. Last eye exam .    Subjective      Diabetic Complications:  Eyes: No  Kidneys: No  Feet: peripheral neuropathy  Heart: No    Diet and Exercise:  Meals per day: 3  Minutes of exercise per week: 120 mins.    Review of Systems:   Review of Systems   Constitutional: Negative.    Cardiovascular: Negative.    Gastrointestinal: Negative.    Endocrine: Negative.        The following portions of the patient's history were reviewed and updated as appropriate: allergies, current medications, past family history, past medical history, past social history, past surgical history and problem list.    Objective       Visit Vitals  /72 (BP Location: Left arm, Patient Position: Sitting, Cuff Size: Adult)   Pulse 120   Temp 97.1 °F (36.2 °C) (Infrared)   Ht 167.6 cm (66\")   Wt (!) 137 kg (301 lb)   SpO2 98%   BMI 48.58 kg/m²       Physical Exam:  Physical Exam  Constitutional:       Appearance: Normal appearance.   Neurological:      Mental Status: She is alert.         Labs:    HbA1c  Lab Results   Component Value Date    HGBA1C 6.4 2021       CMP  Lab Results   Component Value Date    BUN 8 2018    CREATININE 0.67 " 08/28/2018    EGFRIFNONA >60 08/28/2018    EGFRIFAFRI >60 08/28/2018    BCR 12 08/28/2018    K 4.0 08/28/2018    CO2 26 08/28/2018    CALCIUM 9.1 08/28/2018    LABIL2 1.0 02/13/2018    AST 17 02/13/2018    ALT 17 02/13/2018        Lipid Panel  Lab Results   Component Value Date    CHLPL 172 08/10/2018    HDL 44 07/22/2020    LDL 85 07/22/2020    TRIG 129 07/22/2020        TSH  Lab Results   Component Value Date    TSH 1.710 07/22/2020    FREET4 1.37 07/22/2020        Hemoglobin A1C  Lab Results   Component Value Date    HGBA1C 6.4 03/23/2021        Microalbumin/Creatinine  No results found for: MALBCRERATIO, CREATINIURIN, MICROALBUR        Assessment / Plan      Assessment & Plan:  Diagnoses and all orders for this visit:    1. Uncontrolled type 1 diabetes mellitus with hyperglycemia (CMS/[facility]) (Primary)  Assessment & Plan:  Diabetes is unchanged.   Continue current treatment regimen.  Diabetes will be reassessed in 3 months.    CGM shows some spikes with evening snack.  Somewhat variable overnight with no patterns.  Will adjust evening CHO ratio.      2. Type 1 diabetes mellitus with diabetic autonomic neuropathy (CMS/[facility])  Assessment & Plan:  Still having some foot pain.  Increase duloxetine.    Orders:  -     POC Glycosylated Hemoglobin (Hb A1C)    3. Pure hypercholesterolemia  Assessment & Plan:  Continue statin.  Plan to check lipids next visit.      Other orders  -     DULoxetine (CYMBALTA) 30 MG capsule; Take 1 capsule by mouth Daily.  Dispense: 90 capsule; Refill: 3  -     Continuous Blood Gluc Sensor (Dexcom G6 Sensor); Use as directed; change q 10 days.  Dispense: 9 each; Refill: 3  -     Continuous Blood Gluc Transmit (Dexcom G6 Transmitter) misc; Inject 1 each under the skin into the appropriate area as directed Every 3 (Three) Months.  Dispense: 1 each; Refill: 3  -     Discontinue: insulin lispro (humaLOG) 100 UNIT/ML injection; Use in insulin pump - max daily dose 200u  Dispense: 180 mL; Refill: 1  -      metFORMIN (GLUCOPHAGE) 500 MG tablet; Take 2 tablet twice a day  Dispense: 360 tablet; Refill: 1  -     Semaglutide, 1 MG/DOSE, (Ozempic, 1 MG/DOSE,) 2 MG/1.5ML solution pen-injector; Inject 1 mg under the skin into the appropriate area as directed 1 (One) Time Per Week.  Dispense: 9 mL; Refill: 3  -     Discontinue: Insulin Disposable Pump (OmniPod Dash 5 Pack Pods) misc; 10 each Daily.      Return in about 3 months (around 6/23/2021) for Recheck with A1c, CMP, lipids, TSH, microalbumin.    Sukhjinder Thomson MD   03/23/2021   n/a

## 2021-04-19 RX ORDER — INSULIN GLARGINE 300 U/ML
160 INJECTION, SOLUTION SUBCUTANEOUS DAILY
Qty: 52 ML | Refills: 3 | Status: SHIPPED | OUTPATIENT
Start: 2021-04-19 | End: 2021-07-23 | Stop reason: SDUPTHER

## 2021-04-20 ENCOUNTER — TELEPHONE (OUTPATIENT)
Dept: ENDOCRINOLOGY | Facility: CLINIC | Age: 31
End: 2021-04-20

## 2021-04-20 NOTE — TELEPHONE ENCOUNTER
Approvedon April 19  PA Case: [id number], Status: Approved, Coverage Starts on: 4/19/2021 12:00:00 AM, Coverage Ends on: 4/19/2022 12:00:00 AM.  Drug  Toujeo Max SoloStar 300UNIT/ML pen-injectors  Form  [employer] Form (2017 [employer])

## 2021-04-26 ENCOUNTER — TELEPHONE (OUTPATIENT)
Dept: ENDOCRINOLOGY | Facility: CLINIC | Age: 31
End: 2021-04-26

## 2021-04-26 NOTE — TELEPHONE ENCOUNTER
Per the ins Toujeo is not covered and needs to be changed to Trulicity or Victoza. Please change and send to phar. LD

## 2021-05-04 RX ORDER — INSULIN PUMP CONTROLLER
10 EACH MISCELLANEOUS DAILY
Qty: 10 EACH | Refills: 11 | Status: SHIPPED | OUTPATIENT
Start: 2021-05-04 | End: 2021-05-19 | Stop reason: SDUPTHER

## 2021-05-05 ENCOUNTER — TELEPHONE (OUTPATIENT)
Dept: ENDOCRINOLOGY | Facility: CLINIC | Age: 31
End: 2021-05-05

## 2021-05-07 ENCOUNTER — TELEPHONE (OUTPATIENT)
Dept: FAMILY MEDICINE CLINIC | Facility: CLINIC | Age: 31
End: 2021-05-07

## 2021-05-10 ENCOUNTER — TELEPHONE (OUTPATIENT)
Dept: ENDOCRINOLOGY | Facility: CLINIC | Age: 31
End: 2021-05-10

## 2021-05-17 ENCOUNTER — PRIOR AUTHORIZATION (OUTPATIENT)
Dept: ENDOCRINOLOGY | Facility: CLINIC | Age: 31
End: 2021-05-17

## 2021-05-17 NOTE — TELEPHONE ENCOUNTER
Approval was given for Omnipod dash pods. Approval number is [id number] approved starting 5- thru 5-. Pharmacy was notified. Phar is going to have to order them. Phar is going to call ptSerena GRIGSBY

## 2021-05-18 ENCOUNTER — OFFICE VISIT (OUTPATIENT)
Dept: FAMILY MEDICINE CLINIC | Facility: CLINIC | Age: 31
End: 2021-05-18

## 2021-05-18 VITALS
BODY MASS INDEX: 47.09 KG/M2 | TEMPERATURE: 97 F | HEART RATE: 112 BPM | OXYGEN SATURATION: 98 % | DIASTOLIC BLOOD PRESSURE: 66 MMHG | SYSTOLIC BLOOD PRESSURE: 118 MMHG | HEIGHT: 66 IN | WEIGHT: 293 LBS

## 2021-05-18 DIAGNOSIS — E78.00 PURE HYPERCHOLESTEROLEMIA: ICD-10-CM

## 2021-05-18 DIAGNOSIS — F31.9 BIPOLAR 1 DISORDER (HCC): ICD-10-CM

## 2021-05-18 DIAGNOSIS — E10.43 TYPE 1 DIABETES MELLITUS WITH DIABETIC AUTONOMIC NEUROPATHY (HCC): ICD-10-CM

## 2021-05-18 DIAGNOSIS — Z00.00 GENERAL MEDICAL EXAM: Primary | ICD-10-CM

## 2021-05-18 PROCEDURE — 99395 PREV VISIT EST AGE 18-39: CPT | Performed by: PHYSICIAN ASSISTANT

## 2021-05-18 RX ORDER — ATORVASTATIN CALCIUM 10 MG/1
10 TABLET, FILM COATED ORAL NIGHTLY
Qty: 90 TABLET | Refills: 3 | Status: SHIPPED | OUTPATIENT
Start: 2021-05-18 | End: 2021-07-23 | Stop reason: SDUPTHER

## 2021-05-18 NOTE — PROGRESS NOTES
Subjective   Janey Sanchez is a 30 y.o. female  Annual Exam (needs forms completed for foster care )      History of Present Illness  Patient presents today for a preventive medical visit.  Patient is here to determine screening labs and tests that are due and to determine immunization status as well.  Patient will be counseled regarding preventative medicine issues such as regular exercise and  healthy diet as well.  The following portions of the patient's history were reviewed and updated as appropriate: allergies, current medications, past social history and problem list    Review of Systems   Constitutional: Negative.    HENT: Negative.    Eyes: Negative.    Respiratory: Negative.    Cardiovascular: Negative.    Gastrointestinal: Negative.    Endocrine: Negative.    Genitourinary: Negative.    Musculoskeletal: Negative.    Skin: Negative.    Allergic/Immunologic: Negative.    Neurological: Negative.    Hematological: Negative.    Psychiatric/Behavioral: Negative.    All other systems reviewed and are negative.      Objective     Vitals:    05/18/21 1612   BP: 118/66   Pulse: 112   Temp: 97 °F (36.1 °C)   SpO2: 98%       Physical Exam  Vitals and nursing note reviewed.   Constitutional:       General: She is not in acute distress.     Appearance: Normal appearance. She is well-developed. She is not ill-appearing, toxic-appearing or diaphoretic.   HENT:      Head: Normocephalic and atraumatic.      Right Ear: External ear normal.      Left Ear: External ear normal.      Nose: Nose normal.   Eyes:      Conjunctiva/sclera: Conjunctivae normal.   Neck:      Thyroid: No thyromegaly.      Vascular: No carotid bruit or JVD.   Cardiovascular:      Rate and Rhythm: Normal rate and regular rhythm.      Heart sounds: Normal heart sounds. No murmur heard.     Pulmonary:      Effort: Pulmonary effort is normal. No respiratory distress.      Breath sounds: Normal breath sounds.   Abdominal:      General: Bowel sounds are  normal.      Palpations: Abdomen is soft. There is no mass.      Tenderness: There is no abdominal tenderness.   Musculoskeletal:         General: Normal range of motion.      Cervical back: Normal range of motion and neck supple.   Lymphadenopathy:      Cervical: No cervical adenopathy.   Skin:     General: Skin is warm and dry.      Coloration: Skin is not pale.      Findings: No erythema or rash.   Neurological:      Mental Status: She is alert and oriented to person, place, and time.      Cranial Nerves: No cranial nerve deficit.      Coordination: Coordination normal.      Deep Tendon Reflexes: Reflexes are normal and symmetric.   Psychiatric:         Mood and Affect: Mood normal.         Behavior: Behavior normal.         Thought Content: Thought content normal.         Judgment: Judgment normal.       Discussed preventative medicine issues with patient including regular exercise, healthy diet, stress reduction, adequate sleep and recommended age-appropriate screening studies.  Assessment/Plan     Diagnoses and all orders for this visit:    1. General medical exam (Primary)    2. Type 1 diabetes mellitus with diabetic autonomic neuropathy (CMS/HCC)    3. Bipolar 1 disorder (CMS/HCC)    4. Pure hypercholesterolemia    Other orders  -     atorvastatin (LIPITOR) 10 MG tablet; Take 1 tablet by mouth Every Night. TAKE ONE TABLET BY MOUTH DAILY  Dispense: 90 tablet; Refill: 3       Answers for HPI/ROS submitted by the patient on 5/10/2021  Please describe your symptoms.: None. Need health form paperwork filled out for becoming foster/adoptive parent.  Have you had these symptoms before?: No  How long have you been having these symptoms?: 1-4 days  What is the primary reason for your visit?: Other    Foster care forms filled out for foster care parenting.  Cleared for foster care.

## 2021-05-19 ENCOUNTER — TRANSCRIBE ORDERS (OUTPATIENT)
Dept: FAMILY MEDICINE CLINIC | Facility: CLINIC | Age: 31
End: 2021-05-19

## 2021-05-19 ENCOUNTER — TELEPHONE (OUTPATIENT)
Dept: FAMILY MEDICINE CLINIC | Facility: CLINIC | Age: 31
End: 2021-05-19

## 2021-05-19 ENCOUNTER — PATIENT MESSAGE (OUTPATIENT)
Dept: ENDOCRINOLOGY | Facility: CLINIC | Age: 31
End: 2021-05-19

## 2021-05-19 DIAGNOSIS — J84.01 PAP (PULMONARY ALVEOLAR PROTEINOSIS) (HCC): Primary | ICD-10-CM

## 2021-05-19 RX ORDER — INSULIN PUMP CONTROLLER
1 EACH MISCELLANEOUS DAILY
Qty: 30 EACH | Refills: 5 | Status: SHIPPED | OUTPATIENT
Start: 2021-05-19 | End: 2021-07-23 | Stop reason: SDUPTHER

## 2021-05-19 RX ORDER — INSULIN PUMP CART,CONT INF,RF
1 CARTRIDGE (EA) SUBCUTANEOUS DAILY
Qty: 30 EACH | Refills: 5 | Status: SHIPPED | OUTPATIENT
Start: 2021-05-19 | End: 2021-07-23 | Stop reason: SDUPTHER

## 2021-05-19 NOTE — TELEPHONE ENCOUNTER
From: Janey Sanchez  To: Sukhjinder Thomson MD  Sent: 5/19/2021 1:53 PM EDT  Subject: Prescription Question    Hi.     I spoke with my pharmacy about my Omnipod, but they said that I was only called in 10 pods for a month supply.     I use 1 pod per day, and should have 30 pods for a monthly supply.     [facility] pharmacy said I would have to reach back out to your office to let someone know they will need a new prescription to show that I need 1 pod per day or 30 per month.     If you have any questions please message or call me. Thank you.     Janey Sanchez

## 2021-05-19 NOTE — TELEPHONE ENCOUNTER
----- Message from Janey Sanchez sent at 5/19/2021  1:53 PM EDT -----  Regarding: Prescription Question  Contact: [phone]  Hi.     I spoke with my pharmacy about my Omnipod, but they said that I was only called in 10 pods for a month supply.     I use 1 pod per day, and should have 30 pods for a monthly supply.     [facility] pharmacy said I would have to reach back out to your office to let someone know they will need a new prescription to show that I need 1 pod per day or 30 per month.     If you have any questions please message or call me. Thank you.     Janey Sanchez

## 2021-05-20 ENCOUNTER — TELEPHONE (OUTPATIENT)
Dept: ENDOCRINOLOGY | Facility: CLINIC | Age: 31
End: 2021-05-20

## 2021-05-20 NOTE — TELEPHONE ENCOUNTER
[facility] CALLED NEEDING TO CLARIFY HOW MUCH INSULIN PATIENT IS TAKING A DAY. PT TOLD THEM SHE TAKES 200 UNITS A DAY. PLEASE GIVE THEM A CALL.

## 2021-05-20 NOTE — TELEPHONE ENCOUNTER
Spoke with pharmacistFany and verified patient is taking MDD of 200 units, therefore using one pod daily.

## 2021-06-13 ENCOUNTER — PATIENT MESSAGE (OUTPATIENT)
Dept: ENDOCRINOLOGY | Facility: CLINIC | Age: 31
End: 2021-06-13

## 2021-07-20 RX ORDER — LISINOPRIL 5 MG/1
5 TABLET ORAL DAILY
Qty: 90 TABLET | Refills: 1 | Status: SHIPPED | OUTPATIENT
Start: 2021-07-20 | End: 2021-07-23 | Stop reason: SDUPTHER

## 2021-07-23 ENCOUNTER — OFFICE VISIT (OUTPATIENT)
Dept: ENDOCRINOLOGY | Facility: CLINIC | Age: 31
End: 2021-07-23

## 2021-07-23 ENCOUNTER — DOCUMENTATION (OUTPATIENT)
Dept: DIABETES SERVICES | Facility: HOSPITAL | Age: 31
End: 2021-07-23

## 2021-07-23 ENCOUNTER — LAB (OUTPATIENT)
Dept: LAB | Facility: HOSPITAL | Age: 31
End: 2021-07-23

## 2021-07-23 VITALS
SYSTOLIC BLOOD PRESSURE: 112 MMHG | BODY MASS INDEX: 47.09 KG/M2 | DIASTOLIC BLOOD PRESSURE: 76 MMHG | HEIGHT: 66 IN | WEIGHT: 293 LBS | HEART RATE: 100 BPM

## 2021-07-23 DIAGNOSIS — E10.43 TYPE 1 DIABETES MELLITUS WITH DIABETIC AUTONOMIC NEUROPATHY (HCC): ICD-10-CM

## 2021-07-23 DIAGNOSIS — E78.00 PURE HYPERCHOLESTEROLEMIA: ICD-10-CM

## 2021-07-23 DIAGNOSIS — E10.65 UNCONTROLLED TYPE 1 DIABETES MELLITUS WITH HYPERGLYCEMIA (HCC): Primary | ICD-10-CM

## 2021-07-23 DIAGNOSIS — Z96.41 INSULIN PUMP IN PLACE: ICD-10-CM

## 2021-07-23 DIAGNOSIS — E10.65 UNCONTROLLED TYPE 1 DIABETES MELLITUS WITH HYPERGLYCEMIA (HCC): ICD-10-CM

## 2021-07-23 LAB
ALBUMIN SERPL-MCNC: 4 G/DL (ref 3.5–5.2)
ALBUMIN UR-MCNC: 1.5 MG/DL
ALBUMIN/GLOB SERPL: 1.4 G/DL
ALP SERPL-CCNC: 106 U/L (ref 39–117)
ALT SERPL W P-5'-P-CCNC: 18 U/L (ref 1–33)
ANION GAP SERPL CALCULATED.3IONS-SCNC: 11.4 MMOL/L (ref 5–15)
AST SERPL-CCNC: 15 U/L (ref 1–32)
BILIRUB SERPL-MCNC: 0.3 MG/DL (ref 0–1.2)
BUN SERPL-MCNC: 11 MG/DL (ref 6–20)
BUN/CREAT SERPL: 19 (ref 7–25)
CALCIUM SPEC-SCNC: 8.9 MG/DL (ref 8.6–10.5)
CHLORIDE SERPL-SCNC: 102 MMOL/L (ref 98–107)
CHOLEST SERPL-MCNC: 165 MG/DL (ref 0–200)
CO2 SERPL-SCNC: 23.6 MMOL/L (ref 22–29)
CREAT SERPL-MCNC: 0.58 MG/DL (ref 0.57–1)
CREAT UR-MCNC: 40.4 MG/DL
EXPIRATION DATE: ABNORMAL
EXPIRATION DATE: NORMAL
GFR SERPL CREATININE-BSD FRML MDRD: 120 ML/MIN/1.73
GLOBULIN UR ELPH-MCNC: 2.9 GM/DL
GLUCOSE BLDC GLUCOMTR-MCNC: 131 MG/DL (ref 70–130)
GLUCOSE SERPL-MCNC: 130 MG/DL (ref 65–99)
HBA1C MFR BLD: 6.7 %
HDLC SERPL-MCNC: 38 MG/DL (ref 40–60)
LDLC SERPL CALC-MCNC: 88 MG/DL (ref 0–100)
LDLC/HDLC SERPL: 2.13 {RATIO}
Lab: ABNORMAL
Lab: NORMAL
MICROALBUMIN/CREAT UR: 37.1 MG/G
POTASSIUM SERPL-SCNC: 4.2 MMOL/L (ref 3.5–5.2)
PROT SERPL-MCNC: 6.9 G/DL (ref 6–8.5)
SODIUM SERPL-SCNC: 137 MMOL/L (ref 136–145)
TRIGL SERPL-MCNC: 230 MG/DL (ref 0–150)
TSH SERPL DL<=0.05 MIU/L-ACNC: 2.22 UIU/ML (ref 0.27–4.2)
VLDLC SERPL-MCNC: 39 MG/DL (ref 5–40)

## 2021-07-23 PROCEDURE — 84443 ASSAY THYROID STIM HORMONE: CPT

## 2021-07-23 PROCEDURE — 82947 ASSAY GLUCOSE BLOOD QUANT: CPT | Performed by: INTERNAL MEDICINE

## 2021-07-23 PROCEDURE — 82043 UR ALBUMIN QUANTITATIVE: CPT

## 2021-07-23 PROCEDURE — 83036 HEMOGLOBIN GLYCOSYLATED A1C: CPT | Performed by: INTERNAL MEDICINE

## 2021-07-23 PROCEDURE — 99214 OFFICE O/P EST MOD 30 MIN: CPT | Performed by: INTERNAL MEDICINE

## 2021-07-23 PROCEDURE — 82570 ASSAY OF URINE CREATININE: CPT

## 2021-07-23 PROCEDURE — 80053 COMPREHEN METABOLIC PANEL: CPT

## 2021-07-23 PROCEDURE — 80061 LIPID PANEL: CPT

## 2021-07-23 RX ORDER — ATORVASTATIN CALCIUM 10 MG/1
10 TABLET, FILM COATED ORAL NIGHTLY
Qty: 90 TABLET | Refills: 3 | Status: SHIPPED | OUTPATIENT
Start: 2021-07-23

## 2021-07-23 RX ORDER — INSULIN PUMP CONTROLLER
1 EACH MISCELLANEOUS DAILY
Qty: 18 EACH | Refills: 3 | Status: SHIPPED | OUTPATIENT
Start: 2021-07-23 | End: 2021-07-26 | Stop reason: SDUPTHER

## 2021-07-23 RX ORDER — LISINOPRIL 5 MG/1
5 TABLET ORAL DAILY
Qty: 90 TABLET | Refills: 3 | Status: SHIPPED | OUTPATIENT
Start: 2021-07-23

## 2021-07-23 RX ORDER — DULOXETIN HYDROCHLORIDE 60 MG/1
60 CAPSULE, DELAYED RELEASE ORAL DAILY
Qty: 90 CAPSULE | Refills: 3 | Status: SHIPPED | OUTPATIENT
Start: 2021-07-23

## 2021-07-23 RX ORDER — DULOXETIN HYDROCHLORIDE 30 MG/1
30 CAPSULE, DELAYED RELEASE ORAL DAILY
Qty: 90 CAPSULE | Refills: 3 | Status: SHIPPED | OUTPATIENT
Start: 2021-07-23 | End: 2021-07-23 | Stop reason: DRUGHIGH

## 2021-07-23 NOTE — PLAN OF CARE
Courtesy visit for Libre2 initiation. Reviewed CGM usage, sensor placement, troubleshooting, and differences between CGM's as she has been using Dexcom G6. Pt left with sensor in warm up phase. Encouraged pt to call with questions.

## 2021-07-23 NOTE — PROGRESS NOTES
"     Office Note      Date: 2021  Patient Name: Janey Sanchez  MRN: [id number]  : 1990    Chief Complaint   Patient presents with   • Diabetes       History of Present Illness:   Janey Sanchez is a 31 y.o. female who presents for Diabetes type 1 but with insulin resistance. Due to the insulin resistance she is requiring high doses of insulin.  Diagnosed in: . Treated in past with insulin. Current treatments: OmniPod dash insulin pump, metformin, and ozempic. Number of insulin shots per day: none - on pump. She is changing the OmniPod every day due to the high insulin requirements.  Checks blood sugar 288x per day - on DexCom G6 CGM. Has low blood sugar: occasional. Aspirin use: No - no indication. Statin use: Yes. ACE-I/ARB use: Yes. Changes in health since last visit: none. Last eye exam .    Her insurance will no longer cover ozempic or DexCom G6 CGM.    She is considering weight loss surgery - gastric sleeve.  She has hired a  for 4 months and only lost about 5 pounds.  She has been walking every evening.  She has done Weight Watchers for about 6 months.  She lost about 25 pounds with this but has since gained back about 10 pounds. She also tried vegan diet for 6 months.      Subjective      Diabetic Complications:  Eyes: No  Kidneys: No  Feet: peripheral neuropathy  Heart: No    Diet and Exercise:  Meals per day: 3  Minutes of exercise per week: 120 mins.    Review of Systems:   Review of Systems   Constitutional: Negative.    Cardiovascular: Negative.    Gastrointestinal: Negative.    Endocrine: Negative.        The following portions of the patient's history were reviewed and updated as appropriate: allergies, current medications, past family history, past medical history, past social history, past surgical history and problem list.    Objective       Visit Vitals  /76   Pulse 100   Ht 167.6 cm (66\")   Wt (!) 139 kg (306 lb)   BMI 49.39 kg/m²       Physical " Exam:  Physical Exam  Constitutional:       Appearance: Normal appearance.   Cardiovascular:      Pulses:           Dorsalis pedis pulses are 2+ on the right side and 2+ on the left side.        Posterior tibial pulses are 2+ on the right side and 2+ on the left side.   Feet:      Right foot:      Protective Sensation: 5 sites tested. 0 sites sensed.      Skin integrity: Skin integrity normal.      Toenail Condition: Right toenails are normal.      Left foot:      Protective Sensation: 5 sites tested. 0 sites sensed.      Skin integrity: Skin integrity normal.      Comments: Left 1st toenail loose with subungual hematoma  Neurological:      Mental Status: She is alert.         Labs:    HbA1c  Lab Results   Component Value Date    HGBA1C 6.7 07/23/2021       CMP  Lab Results   Component Value Date    BUN 8 08/28/2018    CREATININE 0.67 08/28/2018    EGFRIFNONA >60 08/28/2018    EGFRIFAFRI >60 08/28/2018    BCR 12 08/28/2018    K 4.0 08/28/2018    CO2 26 08/28/2018    CALCIUM 9.1 08/28/2018    LABIL2 1.0 02/13/2018    AST 17 02/13/2018    ALT 17 02/13/2018        Lipid Panel  Lab Results   Component Value Date    CHLPL 172 08/10/2018    HDL 44 07/22/2020    LDL 85 07/22/2020    TRIG 129 07/22/2020        TSH  Lab Results   Component Value Date    TSH 1.710 07/22/2020    FREET4 1.37 07/22/2020        Hemoglobin A1C  Lab Results   Component Value Date    HGBA1C 6.7 07/23/2021        Microalbumin/Creatinine  No results found for: MALBCRERATIO, CREATINIURIN, MICROALBUR        Assessment / Plan      Assessment & Plan:  Diagnoses and all orders for this visit:    1. Uncontrolled type 1 diabetes mellitus with hyperglycemia (CMS/[facility]) (Primary)  Assessment & Plan:  Diabetes is unchanged.  She notes higher glucose overnight.  Increase overnight basal.  Continue current treatment regimen.  Diabetes will be reassessed in 3 months.    We discussed change from ozempic to trulicity.  Also try change from DexCom to FreeStyle  Timi.    Orders:  -     POC Glycosylated Hemoglobin (Hb A1C)  -     POC Glucose, Blood  -     Comprehensive Metabolic Panel; Future  -     Lipid Panel; Future  -     TSH; Future  -     Microalbumin / Creatinine Urine Ratio - Urine, Clean Catch; Future    2. Pure hypercholesterolemia  Assessment & Plan:  Continue statin.  Check lipids today.      3. Insulin pump in place    4. Type 1 diabetes mellitus with diabetic autonomic neuropathy (CMS/HCC)  Assessment & Plan:  Titrate up cymbalta.  Podiatry referral for toenail.    Orders:  -     Ambulatory Referral to Podiatry    Other orders  -     atorvastatin (LIPITOR) 10 MG tablet; Take 1 tablet by mouth Every Night. TAKE ONE TABLET BY MOUTH DAILY  Dispense: 90 tablet; Refill: 3  -     Discontinue: DULoxetine (CYMBALTA) 30 MG capsule; Take 1 capsule by mouth Daily.  Dispense: 90 capsule; Refill: 3  -     Insulin Disposable Pump (OmniPod Dash 5 Pack Pods) misc; 1 each Daily.  Dispense: 18 each; Refill: 3  -     insulin lispro (humaLOG) 100 UNIT/ML injection; Use in insulin pump - max daily dose 200u  Dispense: 180 mL; Refill: 3  -     lisinopril (PRINIVIL,ZESTRIL) 5 MG tablet; Take 1 tablet by mouth Daily. TAKE ONE TABLET BY MOUTH DAILY for renal protection  Dispense: 90 tablet; Refill: 3  -     metFORMIN (GLUCOPHAGE) 500 MG tablet; Take 2 tablet twice a day  Dispense: 360 tablet; Refill: 3  -     Continuous Blood Gluc Sensor (FreeStyle Timi 2 Sensor) misc; 1 each Every 14 (Fourteen) Days.  Dispense: 6 each; Refill: 3  -     DULoxetine (CYMBALTA) 60 MG capsule; Take 1 capsule by mouth Daily.  Dispense: 90 capsule; Refill: 3      Return in about 3 months (around 10/23/2021) for Recheck with A1c.    Sukhjinder Thomson MD   07/23/2021

## 2021-07-23 NOTE — ASSESSMENT & PLAN NOTE
Diabetes is unchanged.  She notes higher glucose overnight.  Increase overnight basal.  Continue current treatment regimen.  Diabetes will be reassessed in 3 months.    We discussed change from ozempic to trulicity.  Also try change from DexCom to FreeStyle Timi.

## 2021-07-26 RX ORDER — INSULIN PUMP CONTROLLER
1 EACH MISCELLANEOUS DAILY
Qty: 18 EACH | Refills: 3 | Status: SHIPPED | OUTPATIENT
Start: 2021-07-26 | End: 2021-09-03 | Stop reason: SDUPTHER

## 2021-08-13 ENCOUNTER — TELEPHONE (OUTPATIENT)
Dept: FAMILY MEDICINE CLINIC | Facility: CLINIC | Age: 31
End: 2021-08-13

## 2021-08-13 RX ORDER — BENZONATATE 200 MG/1
200 CAPSULE ORAL 3 TIMES DAILY PRN
Qty: 21 CAPSULE | Refills: 1 | Status: SHIPPED | OUTPATIENT
Start: 2021-08-13

## 2021-08-13 NOTE — TELEPHONE ENCOUNTER
Please notify patient I sent the medicine in for her for congestion and cough to her pharmacy.  If Symptoms worsen follow-up for appointment

## 2021-08-13 NOTE — TELEPHONE ENCOUNTER
Last eval in office 03/05/2021    Patient called she has head congestion, drainage, cough with mucus. Her oldest child is in the hospital with RSV and she is caring for 10 month old with RSV at home. Wanted to know if there was anyway that something can be called in for her or worked in for a video visit.     [facility] TRUJILLO [address] [place] [address] - [address] [facility] [address] - [phone]  - [phone] [address] [address]   Phone: [phone] Fax: [phone]   Hours: Not open 24 hours

## 2021-09-07 RX ORDER — INSULIN PUMP CONTROLLER
1 EACH MISCELLANEOUS DAILY
Qty: 18 EACH | Refills: 0 | Status: SHIPPED | OUTPATIENT
Start: 2021-09-07 | End: 2021-11-29

## 2021-09-09 RX ORDER — DULAGLUTIDE 1.5 MG/.5ML
1.5 INJECTION, SOLUTION SUBCUTANEOUS
Qty: 6 ML | Refills: 3 | Status: SHIPPED | OUTPATIENT
Start: 2021-09-09

## 2021-09-16 ENCOUNTER — TELEPHONE (OUTPATIENT)
Dept: FAMILY MEDICINE CLINIC | Facility: CLINIC | Age: 31
End: 2021-09-16

## 2021-09-17 ENCOUNTER — TELEPHONE (OUTPATIENT)
Dept: ENDOCRINOLOGY | Facility: CLINIC | Age: 31
End: 2021-09-17

## 2021-09-17 ENCOUNTER — TELEPHONE (OUTPATIENT)
Dept: FAMILY MEDICINE CLINIC | Facility: CLINIC | Age: 31
End: 2021-09-17

## 2021-09-17 NOTE — TELEPHONE ENCOUNTER
Caller: Janey Sanchez    Relationship: Self    Best call back number: [phone]    What orders are you requesting (i.e. lab or imaging): COVID ANTIBODY TEST    In what timeframe would the patient need to come in: ASAP    Where will you receive your lab/imaging services: [facility]     Additional notes:

## 2021-09-17 NOTE — TELEPHONE ENCOUNTER
They now want the specific medication needed for infusion with the dosing. I put the original order on your desk for review

## 2021-09-17 NOTE — TELEPHONE ENCOUNTER
Caller: Janey Sanchez    Relationship: Self    Best call back number: [phone]    What orders are you requesting (i.e. lab or imaging): COVID-19 ANTIBODY INFUSION    In what timeframe would the patient need to come in: AS SOON AS POSSIBLE      THE PATIENT STATES THAT A PRESCRIPTION WAS SENT TO [facility] FOR HER TO HAVE THE INFUSION HOWEVER THEY ARE SAYING THAT IT WAS MISSING INFORMATION THEY TOLD THE PATIENT THAT THE PRESCRIPTION NEEDS TO HAVE THE DIAGNOSIS, SYMPTOMS, THE NAME OF MEDICATION FOR THE INFUSION, DOSAGE, AND INFORMATION ABOUT THE PATIENT BEING TYPE 1 DIABETIC AS WELL AS THE DOCTOR SIGNATURE. IF POSSIBLE PLEASE FAX A NEW PRESCRIPTION TO [facility]'S [phone]. PLEASE CALL PATIENT TO LET HER KNOW IF THAT CAN BE DONE

## 2021-09-20 ENCOUNTER — TELEPHONE (OUTPATIENT)
Dept: FAMILY MEDICINE CLINIC | Facility: CLINIC | Age: 31
End: 2021-09-20

## 2021-11-29 RX ORDER — INSULIN PUMP CONTROLLER
EACH MISCELLANEOUS
Qty: 90 EACH | Refills: 3 | Status: SHIPPED | OUTPATIENT
Start: 2021-11-29

## 2022-03-03 ENCOUNTER — DOCUMENTATION (OUTPATIENT)
Dept: ENDOCRINOLOGY | Facility: CLINIC | Age: 32
End: 2022-03-03

## 2022-03-03 NOTE — PROGRESS NOTES
Patient declined filling specialty medication at [facility] and/or enrollment in the Patient Management Program.    Patient gets cheaper copays through mail order    Aruna Johnson CPhT  Pharmacy Care Coordinator  3/3/2022  11:45 EST

## 2022-06-27 RX ORDER — ATORVASTATIN CALCIUM 10 MG/1
TABLET, FILM COATED ORAL
Qty: 90 TABLET | Refills: 3 | OUTPATIENT
Start: 2022-06-27

## 2022-06-27 RX ORDER — DULOXETIN HYDROCHLORIDE 60 MG/1
CAPSULE, DELAYED RELEASE ORAL
Qty: 90 CAPSULE | Refills: 3 | OUTPATIENT
Start: 2022-06-27

## 2024-02-08 NOTE — PROGRESS NOTES
Subjective   Janey Sanchez is a 30 y.o. female  Back Pain (increased mid back pain since Sunday )      History of Present Illness  Patient is a pleasant 30-year-old white female presents today for evaluation treatment of persistent pain in her right upper back around the shoulder blade region.  This started on Sunday when she was sitting on couch.  She has not done any new activities that would have caused overuse of her upper back, neck or upper extremities.  No history of recent trauma.  She is tried ibuprofen without any relief.  She states the pain is staying in one particular area.  No shortness of breath, no cough no fever no rash.  The following portions of the patient's history were reviewed and updated as appropriate: allergies, current medications, past social history and problem list    Review of Systems   Constitutional: Negative.  Negative for fever.   Respiratory: Negative.    Cardiovascular: Negative for chest pain.   Gastrointestinal: Negative.  Negative for abdominal pain.   Genitourinary: Negative.  Negative for dysuria and pelvic pain.   Musculoskeletal: Positive for back pain. Negative for arthralgias, gait problem and myalgias.   Neurological: Negative for dizziness, tremors, weakness, numbness and headaches.       Objective     Vitals:    08/13/20 1527   BP: 128/82   Pulse: 92   Temp: 98.6 °F (37 °C)   SpO2: 99%       Physical Exam   Constitutional: She is oriented to person, place, and time. She appears well-developed and well-nourished. No distress.   Obesity noted     HENT:   Head: Normocephalic and atraumatic.   Neck: No thyromegaly present.   Cardiovascular: Normal rate, regular rhythm and normal heart sounds.   Pulmonary/Chest: Effort normal and breath sounds normal. No respiratory distress.   Abdominal: Soft. There is no tenderness.   Musculoskeletal: She exhibits tenderness.        Thoracic back: She exhibits tenderness and spasm. She exhibits normal range of motion, no bony  tenderness and no edema.        Back:    Neurological: She is alert and oriented to person, place, and time.   Skin: Skin is warm and dry. No rash noted. She is not diaphoretic. No erythema. No pallor.   Psychiatric: She has a normal mood and affect. Her behavior is normal. Judgment and thought content normal.   Nursing note and vitals reviewed.      Assessment/Plan     Janey was seen today for back pain.    Diagnoses and all orders for this visit:    Muscle strain of right scapular region, initial encounter    Other orders  -     tiZANidine (ZANAFLEX) 2 MG tablet; Take 1 tablet by mouth Every 8 (Eight) Hours As Needed for Muscle Spasms. backpain    Will refer to physical therapy if pain unimproved by next week.  Recommended applying ice as needed  Answers for HPI/ROS submitted by the patient on 8/13/2020   Back pain  What is the primary reason for your visit?: Back Pain     No